# Patient Record
Sex: FEMALE | Employment: OTHER | URBAN - METROPOLITAN AREA
[De-identification: names, ages, dates, MRNs, and addresses within clinical notes are randomized per-mention and may not be internally consistent; named-entity substitution may affect disease eponyms.]

---

## 2017-09-15 ENCOUNTER — HOSPITAL ENCOUNTER (OUTPATIENT)
Dept: BONE DENSITY | Age: 62
Discharge: HOME | End: 2017-09-15
Attending: INTERNAL MEDICINE
Payer: OTHER GOVERNMENT

## 2017-09-15 DIAGNOSIS — M81.0 OSTEOPOROSIS: ICD-10-CM

## 2017-09-15 PROCEDURE — 77080 DXA BONE DENSITY AXIAL: CPT

## 2018-09-17 ENCOUNTER — HOSPITAL ENCOUNTER (OUTPATIENT)
Dept: CT IMAGING | Age: 63
Discharge: HOME | End: 2018-09-17
Attending: OTOLARYNGOLOGY
Payer: MEDICARE

## 2018-09-17 DIAGNOSIS — J32.9 SINUSITIS: ICD-10-CM

## 2018-09-17 PROCEDURE — 70486 CT MAXILLOFACIAL W/O DYE: CPT

## 2019-01-09 ENCOUNTER — HOSPITAL ENCOUNTER (OUTPATIENT)
Dept: LAB | Age: 64
Discharge: HOME | End: 2019-01-09
Payer: MEDICARE

## 2019-01-09 PROCEDURE — 86628 CANDIDA ANTIBODY: CPT

## 2019-01-09 PROCEDURE — 36415 COLL VENOUS BLD VENIPUNCTURE: CPT

## 2019-01-09 PROCEDURE — 86332 IMMUNE COMPLEX ASSAY: CPT

## 2019-01-10 LAB — IC SERPL RAJI CELL-ACNC: 16.3 UG EQ/ML

## 2019-01-11 LAB
C ALBICANS IGG QN: 200 U/ML (ref 0–29)
CANDIDA IGA SER IA-ACNC: 12 U/ML (ref 0–9)
CANDIDA IGM SER IA-ACNC: 16 U/ML (ref 0–9)

## 2019-01-16 LAB — C ALBICANS AB SER QL ID: NEGATIVE

## 2019-03-15 ENCOUNTER — HOSPITAL ENCOUNTER (OUTPATIENT)
Dept: LAB | Age: 64
Discharge: HOME | End: 2019-03-15
Payer: MEDICARE

## 2019-03-15 PROCEDURE — 86003 ALLG SPEC IGE CRUDE XTRC EA: CPT

## 2019-03-15 PROCEDURE — 73060999999 HC MISC LAB CHARGE

## 2019-03-15 PROCEDURE — 86628 CANDIDA ANTIBODY: CPT

## 2019-03-15 PROCEDURE — 36415 COLL VENOUS BLD VENIPUNCTURE: CPT

## 2019-03-15 PROCEDURE — 86332 IMMUNE COMPLEX ASSAY: CPT

## 2019-03-15 PROCEDURE — 82785 ASSAY OF IGE: CPT

## 2019-03-18 LAB
C ALBICANS IGG QN: 200 U/ML (ref 0–29)
CANDIDA IGA SER IA-ACNC: 13 U/ML (ref 0–9)
CANDIDA IGM SER IA-ACNC: 12 U/ML (ref 0–9)

## 2019-03-22 LAB — IGE SERPL-ACNC: 27 IU/ML (ref 6–495)

## 2019-03-25 LAB
Lab: NORMAL
Lab: NORMAL
REFERENCE LAB,REFLB: NORMAL
REFERENCE LAB,REFLB: NORMAL
TEST DESCRIPTION:,ATST: NORMAL
TEST DESCRIPTION:,ATST: NORMAL

## 2020-07-30 ENCOUNTER — OFFICE VISIT (OUTPATIENT)
Dept: ORTHOPEDIC SURGERY | Age: 65
End: 2020-07-30

## 2020-07-30 VITALS
TEMPERATURE: 96.2 F | HEIGHT: 69 IN | SYSTOLIC BLOOD PRESSURE: 130 MMHG | OXYGEN SATURATION: 99 % | HEART RATE: 62 BPM | WEIGHT: 150 LBS | DIASTOLIC BLOOD PRESSURE: 69 MMHG | BODY MASS INDEX: 22.22 KG/M2 | RESPIRATION RATE: 16 BRPM

## 2020-07-30 DIAGNOSIS — M67.441 GANGLION OF FLEXOR TENDON SHEATH OF RIGHT MIDDLE FINGER: Primary | ICD-10-CM

## 2020-07-30 DIAGNOSIS — M79.641 HAND PAIN, RIGHT: ICD-10-CM

## 2020-07-30 RX ORDER — TRIAMCINOLONE ACETONIDE 10 MG/ML
10 INJECTION, SUSPENSION INTRA-ARTICULAR; INTRALESIONAL ONCE
Qty: 1 VIAL | Refills: 0
Start: 2020-07-30 | End: 2020-07-30

## 2020-07-30 RX ORDER — ERGOCALCIFEROL 1.25 MG/1
CAPSULE ORAL
COMMUNITY
Start: 2020-06-24

## 2020-07-30 RX ORDER — AZELASTINE HYDROCHLORIDE, FLUTICASONE PROPIONATE 137; 50 UG/1; UG/1
SPRAY, METERED NASAL
COMMUNITY

## 2020-07-30 RX ORDER — CETIRIZINE HCL 10 MG
TABLET ORAL
COMMUNITY

## 2020-07-30 NOTE — PROGRESS NOTES
Sandra Hartmann is a 77 y.o. female right handed individual, not currently working. Worker's Compensation and legal considerations: none filed. Vitals:    07/30/20 0817   BP: 130/69   Pulse: 62   Resp: 16   Temp: (!) 96.2 °F (35.7 °C)   TempSrc: Temporal   SpO2: 99%   Weight: 150 lb (68 kg)   Height: 5' 9\" (1.753 m)   PainSc:   1   PainLoc: Finger           Chief Complaint   Patient presents with    Hand Pain     right middle finger         HPI: Patient comes in today with complaints of right hand pain just proximal to her middle finger. She reports a mass in this area that is tender. She denies any injury. Date of onset: Indeterminate    Injury: No    Prior Treatment:  No    Numbness/ Tingling: No      ROS: Review of Systems - General ROS: negative  Psychological ROS: negative  ENT ROS: negative  Allergy and Immunology ROS: negative  Hematological and Lymphatic ROS: negative  Respiratory ROS: no cough, shortness of breath, or wheezing  Cardiovascular ROS: no chest pain or dyspnea on exertion  Gastrointestinal ROS: no abdominal pain, change in bowel habits, or black or bloody stools  Musculoskeletal ROS: positive for - pain in hand - right  Neurological ROS: negative  Dermatological ROS: negative    Past Medical History:   Diagnosis Date    Hypothyroid     IBS (irritable bowel syndrome)        Past Surgical History:   Procedure Laterality Date    HX HYSTERECTOMY      HX TONSILLECTOMY         Current Outpatient Medications   Medication Sig Dispense Refill    azelastine-fluticasone (Dymista) 137-50 mcg/spray spry Dymista 137 mcg-50 mcg/spray nasal spray      cetirizine (ZYRTEC) 10 mg tablet cetirizine 10 mg tablet      ergocalciferol (ERGOCALCIFEROL) 1,250 mcg (50,000 unit) capsule       triamcinolone acetonide (Kenalog) 10 mg/mL injection 1 mL by IntraMUSCular route once for 1 dose. 1 Vial 0    levothyroxine (SYNTHROID) 50 mcg tablet Take  by mouth Daily (before breakfast).       Cholecalciferol, Vitamin D3, (VITAMIN D3) 1,000 unit cap Take  by mouth.  aspirin 81 mg tablet Take 81 mg by mouth.  calcium 500 mg Tab Take  by mouth.  HYDROcodone-acetaminophen (NORCO) 5-325 mg per tablet Take 1-2 tablets PO every 4-6 hours as needed for pain control. If over the counter ibuprofen or acetaminophen was suggested, then only take the vicodin for pain not well controlled with the over the counter medication. 20 Tab 0    fluconazole (DIFLUCAN) 150 mg tablet Take 1 PO in 5 days and repeat in 10 days if needed for yeast symptoms 2 Tab 0       Allergies   Allergen Reactions    Flagyl [Metronidazole] Hives    Septra [Sulfamethoprim Ds] Hives           PE:     Physical Exam  Vitals signs and nursing note reviewed. Constitutional:       General: She is not in acute distress. Appearance: Normal appearance. She is not ill-appearing. Eyes:      Pupils: Pupils are equal, round, and reactive to light. Neck:      Musculoskeletal: Normal range of motion. Cardiovascular:      Pulses: Normal pulses. Pulmonary:      Effort: Pulmonary effort is normal. No respiratory distress. Abdominal:      General: Abdomen is flat. Musculoskeletal: Normal range of motion. General: Swelling and tenderness present. No deformity or signs of injury. Right lower leg: No edema. Left lower leg: No edema. Skin:     General: Skin is warm and dry. Findings: No bruising or erythema. Neurological:      General: No focal deficit present. Mental Status: She is alert and oriented to person, place, and time. Psychiatric:         Mood and Affect: Mood normal.         Behavior: Behavior normal.            Hand: There is a pigmented tender and mobile compressible mass about the A1 pulley of the right middle finger. Occipitally 5 mm in diameter.     Examination L Digit(s) R Digit(s)   1st CMC Tenderness -  -    1st CMC Grind -  -    [place] Nodes -  -    [place] Nodes -  -    A1 Pulley Tenderness -  + LF   Triggering -  -    UCL Instability -  -    RCL Instability -  -    Lateral Stress Pain -  -    Palmar Cords -  -    Tabletop test -  -    Garrod's Pads -  -     Strength       Pinch Strength         ROM: Full        Imagin2020 plain films of the right hand does not show any fracture, dislocation, or any other osseous abnormalities. ICD-10-CM ICD-9-CM    1. Ganglion of flexor tendon sheath of right middle finger  M67.441 727.42 triamcinolone acetonide (Kenalog) 10 mg/mL injection      ASPIRAT/INJECTION GANGLION CYST(S)      TRIAMCINOLONE ACETONIDE INJ   2. Hand pain, right  M79.641 729.5 AMB POC XRAY, HAND; 3+ VIEWS         Plan:     Right middle finger ganglion cyst of flexor tendon sheath injection. This injection cause some bruising and mild bleeding which may indicate an aneurysm versus a ganglion cyst or possibly a well vascularized ganglion cyst.    Follow-up and Dispositions    · Return if symptoms worsen or fail to improve. Plan was reviewed with patient, who verbalized agreement and understanding of the plan    Patience 36 NOTE        Chart reviewed for the following:   Riley MERCER DO, have reviewed the History, Physical and updated the Allergic reactions for [address] [place] [address] performed immediately prior to start of procedure:   Riley MERCER DO, have performed the following reviews on Steffi Watts prior to the start of the procedure:            * Patient was identified by name and date of birth   * Agreement on procedure being performed was verified  * Risks and Benefits explained to the patient  * Procedure site verified and marked as necessary  * Patient was positioned for comfort  * Consent was signed and verified     Time: 09:00 AM      Date of procedure: 2020    Procedure performed by: Tita Cortez DO    Provider assisted by:  Jonathan Weston LPN    Patient assisted by: self    How tolerated by patient: tolerated the procedure well with no complications    Post Procedural Pain Scale: 0 - No Hurt    Comments: none    Procedure:  After consent was obtained, using sterile technique the tendon and ganglion was prepped. Local anesthetic used: 1% lidocaine. Kenalog 5 mg and was then injected and the needle withdrawn. The procedure was well tolerated. The patient is asked to continue to rest the area for a few more days before resuming regular activities. It may be more painful for the first 1-2 days. Watch for fever, or increased swelling or persistent pain in the joint. Call or return to clinic prn if such symptoms occur or there is failure to improve as anticipated.

## 2020-09-03 ENCOUNTER — OFFICE VISIT (OUTPATIENT)
Dept: ORTHOPEDIC SURGERY | Age: 65
End: 2020-09-03

## 2020-09-03 VITALS
TEMPERATURE: 97.4 F | HEART RATE: 64 BPM | BODY MASS INDEX: 21.89 KG/M2 | DIASTOLIC BLOOD PRESSURE: 65 MMHG | WEIGHT: 147.8 LBS | HEIGHT: 69 IN | RESPIRATION RATE: 16 BRPM | SYSTOLIC BLOOD PRESSURE: 130 MMHG | OXYGEN SATURATION: 100 %

## 2020-09-03 DIAGNOSIS — M67.441 GANGLION OF FLEXOR TENDON SHEATH OF RIGHT MIDDLE FINGER: Primary | ICD-10-CM

## 2020-09-03 RX ORDER — CHOLECALCIFEROL (VITAMIN D3) 50 MCG
CAPSULE ORAL
COMMUNITY

## 2020-09-03 NOTE — PROGRESS NOTES
Roman Astudillo is a 77 y.o. female right handed individual, not currently working. Worker's Compensation and legal considerations: none filed. Vitals:    09/03/20 0818   BP: 130/65   Pulse: 64   Resp: 16   Temp: 97.4 °F (36.3 °C)   TempSrc: Oral   SpO2: 100%   Weight: 147 lb 12.8 oz (67 kg)   Height: 5' 9\" (1.753 m)   PainSc:  10 - Worst pain ever   PainLoc: Hand           Chief Complaint   Patient presents with    Hand Pain     Right       HPI: Patient comes in today for follow-up of her right middle finger mass. At her last visit she had an injected and decompressed. There was some bleeding which may indicate either a vascularized ganglion cyst or a pseudoaneurysm. She is concerned about the possibility of an aneurysm. She denies any pain currently or since the injection. Initial HPI: Patient comes in today with complaints of right hand pain just proximal to her middle finger. She reports a mass in this area that is tender. She denies any injury. Date of onset: Indeterminate    Injury: No    Prior Treatment:  Yes: Comment: Injection    Numbness/ Tingling: No      ROS: Review of Systems - General ROS: negative  Psychological ROS: negative  ENT ROS: negative  Allergy and Immunology ROS: negative  Hematological and Lymphatic ROS: negative  Respiratory ROS: no cough, shortness of breath, or wheezing  Cardiovascular ROS: no chest pain or dyspnea on exertion  Gastrointestinal ROS: no abdominal pain, change in bowel habits, or black or bloody stools  Musculoskeletal ROS: positive for - pain in hand - right  Neurological ROS: negative  Dermatological ROS: negative    Past Medical History:   Diagnosis Date    Hypothyroid     IBS (irritable bowel syndrome)        Past Surgical History:   Procedure Laterality Date    HX HYSTERECTOMY      HX TONSILLECTOMY         Current Outpatient Medications   Medication Sig Dispense Refill    B.infantis-B.ani-B.long-B.bifi (Probiotic 4X) 10-15 mg TbEC Take  by mouth.  GLUTAMINE by Does Not Apply route.  azelastine-fluticasone (Dymista) 137-50 mcg/spray spry Dymista 137 mcg-50 mcg/spray nasal spray      cetirizine (ZYRTEC) 10 mg tablet cetirizine 10 mg tablet      ergocalciferol (ERGOCALCIFEROL) 1,250 mcg (50,000 unit) capsule       levothyroxine (SYNTHROID) 50 mcg tablet Take  by mouth Daily (before breakfast).  Cholecalciferol, Vitamin D3, (VITAMIN D3) 1,000 unit cap Take  by mouth.  aspirin 81 mg tablet Take 81 mg by mouth.  calcium 500 mg Tab Take  by mouth.  HYDROcodone-acetaminophen (NORCO) 5-325 mg per tablet Take 1-2 tablets PO every 4-6 hours as needed for pain control. If over the counter ibuprofen or acetaminophen was suggested, then only take the vicodin for pain not well controlled with the over the counter medication. 20 Tab 0    fluconazole (DIFLUCAN) 150 mg tablet Take 1 PO in 5 days and repeat in 10 days if needed for yeast symptoms 2 Tab 0       Allergies   Allergen Reactions    Flagyl [Metronidazole] Hives    Septra [Sulfamethoprim Ds] Hives           PE:     Physical Exam  Vitals signs and nursing note reviewed. Constitutional:       General: She is not in acute distress. Appearance: Normal appearance. She is not ill-appearing. Eyes:      Pupils: Pupils are equal, round, and reactive to light. Neck:      Musculoskeletal: Normal range of motion. Cardiovascular:      Pulses: Normal pulses. Pulmonary:      Effort: Pulmonary effort is normal. No respiratory distress. Abdominal:      General: Abdomen is flat. Musculoskeletal: Normal range of motion. General: No swelling, tenderness, deformity or signs of injury. Right lower leg: No edema. Left lower leg: No edema. Skin:     General: Skin is warm and dry. Findings: No bruising or erythema. Neurological:      General: No focal deficit present. Mental Status: She is alert and oriented to person, place, and time.    Psychiatric: Mood and Affect: Mood normal.         Behavior: Behavior normal.            Hand: There is no tenderness to palpation about the middle finger mass previously injected. Range of motion is full about all digits of the hand. Examination L Digit(s) R Digit(s)   1st CMC Tenderness -  -    1st CMC Grind -  -    [place] Nodes -  -    [place] Nodes -  -    A1 Pulley Tenderness -  - LF   Triggering -  -    UCL Instability -  -    RCL Instability -  -    Lateral Stress Pain -  -    Palmar Cords -  -    Tabletop test -  -    Garrod's Pads -  -     Strength       Pinch Strength         ROM: Full        Imagin2020 plain films of the right hand does not show any fracture, dislocation, or any other osseous abnormalities. ICD-10-CM ICD-9-CM    1. Ganglion of flexor tendon sheath of right middle finger  M67.441 727.42          Plan:     Had a discussion with the patient regarding treatment going forward. I told her that if we were going to do anything it would likely have to be surgical given the lack of resolution with an injection. However since the injection has reduced most of her symptoms we can do this whenever she is ready. She states she would like to think about going forward with surgery but is not ready yet. Follow-up and Dispositions    · Return if symptoms worsen or fail to improve, for Possible surgical discussion.           Plan was reviewed with patient, who verbalized agreement and understanding of the plan

## 2020-09-03 NOTE — PROGRESS NOTES
1. Have you been to the ER, urgent care clinic since your last visit? Hospitalized since your last visit? No    2. Have you seen or consulted any other health care providers outside of the [address] [place] [address] since your last visit? Include any pap smears or colon screening.  No

## 2025-06-23 ENCOUNTER — APPOINTMENT (OUTPATIENT)
Age: 70
End: 2025-06-23
Payer: MEDICARE

## 2025-06-23 ENCOUNTER — HOSPITAL ENCOUNTER (EMERGENCY)
Age: 70
Discharge: HOME | End: 2025-06-23
Attending: EMERGENCY MEDICINE
Payer: MEDICARE

## 2025-06-23 VITALS
DIASTOLIC BLOOD PRESSURE: 57 MMHG | BODY MASS INDEX: 24.94 KG/M2 | SYSTOLIC BLOOD PRESSURE: 129 MMHG | WEIGHT: 168.4 LBS | TEMPERATURE: 97.9 F | OXYGEN SATURATION: 99 % | HEIGHT: 69 IN | HEART RATE: 72 BPM | RESPIRATION RATE: 18 BRPM
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
## 2025-06-23 DIAGNOSIS — M54.6 ACUTE MIDLINE THORACIC BACK PAIN: ICD-10-CM

## 2025-06-23 DIAGNOSIS — S09.90XA INJURY OF HEAD, INITIAL ENCOUNTER: Primary | ICD-10-CM

## 2025-06-23 PROCEDURE — 72125 CT NECK SPINE W/O DYE: CPT

## 2025-06-23 PROCEDURE — 72072 X-RAY EXAM THORAC SPINE 3VWS: CPT

## 2025-06-23 PROCEDURE — 99284 EMERGENCY DEPT VISIT MOD MDM: CPT

## 2025-06-23 PROCEDURE — 70450 CT HEAD/BRAIN W/O DYE: CPT

## 2025-06-23 ASSESSMENT — PAIN DESCRIPTION - LOCATION: LOCATION: HEAD

## 2025-06-23 ASSESSMENT — LIFESTYLE VARIABLES
HOW OFTEN DO YOU HAVE A DRINK CONTAINING ALCOHOL: NEVER
HOW MANY STANDARD DRINKS CONTAINING ALCOHOL DO YOU HAVE ON A TYPICAL DAY: PATIENT DOES NOT DRINK

## 2025-06-23 ASSESSMENT — PAIN DESCRIPTION - PAIN TYPE: TYPE: ACUTE PAIN

## 2025-06-23 ASSESSMENT — PAIN SCALES - GENERAL: PAINLEVEL_OUTOF10: 3

## 2025-06-23 ASSESSMENT — PAIN DESCRIPTION - ORIENTATION: ORIENTATION: POSTERIOR

## 2025-06-23 ASSESSMENT — PAIN DESCRIPTION - DESCRIPTORS: DESCRIPTORS: ACHING

## 2025-06-24 NOTE — ED TRIAGE NOTES
Patient A/O x 4, presented to the ED with complaint of head/neck pain after hitting her head on wall coming down stairs x 2 hours ago. Patient denies blurred vision, N/V, LOC.

## 2025-06-24 NOTE — ED PROVIDER NOTES
EMERGENCY DEPARTMENT HISTORY AND PHYSICAL EXAM    9:20 PM EDT seen at this time in room QC        Date: 6/23/2025  Patient Name: Yolanda Guerra    History of Presenting Illness     Chief Complaint   Patient presents with    Fall         History Provided By: patient    Additional History (Context): Yolanda Guerra is a 70 y.o. female presents with history of arthritis and prior neck and lumbar spine injuries, had a mechanical fall on her stairs this evening falling backwards struck her occiput.  Made her dizzy.  Reports thoracic back pain between her shoulder blades and some neck pain.  No new focal neurologic deficits.  Has some lasting abnormalities from an injury in the service..    PCP: Mark Jewell DO    Chief Complaint:   Duration:    Timing:    Location:   Quality:   Severity:   Modifying Factors:   Associated Symptoms:       No current facility-administered medications for this encounter.     Current Outpatient Medications   Medication Sig Dispense Refill    Azelastine-Fluticasone 137-50 MCG/ACT SUSP Dymista 137 mcg-50 mcg/spray nasal spray      vitamin D 25 MCG (1000 UT) CAPS Take by mouth      cetirizine (ZYRTEC) 10 MG tablet cetirizine 10 mg tablet (Patient not taking: Reported on 6/23/2025)      ergocalciferol (ERGOCALCIFEROL) 1.25 MG (26346 UT) capsule ceived the following from [facility]: Outside name: ergocalciferol (ERGOCALCIFEROL) 1,250 mcg (50,000 unit) capsule (Patient not taking: Reported on 6/23/2025)      fluconazole (DIFLUCAN) 150 MG tablet Take 1 PO in 5 days and repeat in 10 days if needed for yeast symptoms (Patient not taking: Reported on 6/23/2025)      HYDROcodone-acetaminophen (NORCO) 5-325 MG per tablet Take 1-2 tablets PO every 4-6 hours as needed for pain control.  If over the counter ibuprofen or acetaminophen was suggested, then only take the vicodin for pain not well controlled with the over the counter medication. (Patient not taking: Reported on 6/23/2025)